# Patient Record
Sex: MALE | Race: WHITE | Employment: FULL TIME | URBAN - METROPOLITAN AREA
[De-identification: names, ages, dates, MRNs, and addresses within clinical notes are randomized per-mention and may not be internally consistent; named-entity substitution may affect disease eponyms.]

---

## 2018-04-03 ENCOUNTER — OFFICE VISIT (OUTPATIENT)
Dept: INTERNAL MEDICINE CLINIC | Age: 43
End: 2018-04-03

## 2018-04-03 VITALS
DIASTOLIC BLOOD PRESSURE: 82 MMHG | BODY MASS INDEX: 28.7 KG/M2 | WEIGHT: 205 LBS | SYSTOLIC BLOOD PRESSURE: 124 MMHG | HEART RATE: 60 BPM | HEIGHT: 71 IN

## 2018-04-03 DIAGNOSIS — G44.82 ORGASMIC HEADACHE: ICD-10-CM

## 2018-04-03 DIAGNOSIS — Z76.89 ENCOUNTER TO ESTABLISH CARE: Primary | ICD-10-CM

## 2018-04-03 DIAGNOSIS — G44.52 NEW DAILY PERSISTENT HEADACHE: ICD-10-CM

## 2018-04-03 PROCEDURE — 99203 OFFICE O/P NEW LOW 30 MIN: CPT | Performed by: NURSE PRACTITIONER

## 2018-04-03 RX ORDER — SUMATRIPTAN 20 MG/1
1 SPRAY NASAL DAILY PRN
Qty: 1 INHALER | Refills: 3 | Status: SHIPPED | OUTPATIENT
Start: 2018-04-03 | End: 2018-11-12

## 2018-04-03 RX ORDER — IBUPROFEN 200 MG
400 TABLET ORAL PRN
COMMUNITY
End: 2018-11-12

## 2018-04-03 RX ORDER — ACETAMINOPHEN, ASPIRIN AND CAFFEINE 250; 250; 65 MG/1; MG/1; MG/1
2 TABLET, FILM COATED ORAL EVERY 12 HOURS PRN
COMMUNITY
End: 2018-11-12

## 2018-04-03 RX ORDER — BUTALBITAL, ASPIRIN, AND CAFFEINE 325; 50; 40 MG/1; MG/1; MG/1
1 CAPSULE ORAL EVERY 4 HOURS PRN
COMMUNITY
Start: 2018-03-21 | End: 2018-04-16

## 2018-04-03 RX ORDER — MELOXICAM 15 MG/1
1 TABLET ORAL DAILY
COMMUNITY
Start: 2018-03-16 | End: 2018-04-16

## 2018-04-03 RX ORDER — PREDNISONE 10 MG/1
1 TABLET ORAL DAILY
COMMUNITY
Start: 2018-03-26 | End: 2018-04-09

## 2018-04-03 RX ORDER — AMOXICILLIN AND CLAVULANATE POTASSIUM 500; 125 MG/1; MG/1
1 TABLET, FILM COATED ORAL 3 TIMES DAILY
COMMUNITY
Start: 2018-04-02 | End: 2018-04-17

## 2018-04-04 ASSESSMENT — ENCOUNTER SYMPTOMS
RESPIRATORY NEGATIVE: 1
GASTROINTESTINAL NEGATIVE: 1
CHEST TIGHTNESS: 0

## 2018-04-06 DIAGNOSIS — G44.52 NEW PERSISTENT DAILY HEADACHE: ICD-10-CM

## 2018-04-06 DIAGNOSIS — G44.82 ORGASMIC HEADACHE: Primary | ICD-10-CM

## 2018-04-16 ENCOUNTER — OFFICE VISIT (OUTPATIENT)
Dept: INTERNAL MEDICINE CLINIC | Age: 43
End: 2018-04-16

## 2018-04-16 VITALS
BODY MASS INDEX: 28.56 KG/M2 | DIASTOLIC BLOOD PRESSURE: 80 MMHG | HEART RATE: 60 BPM | HEIGHT: 71 IN | SYSTOLIC BLOOD PRESSURE: 114 MMHG | WEIGHT: 204 LBS

## 2018-04-16 DIAGNOSIS — G44.52 NEW DAILY PERSISTENT HEADACHE: ICD-10-CM

## 2018-04-16 DIAGNOSIS — G44.82 ORGASMIC HEADACHE: Primary | ICD-10-CM

## 2018-04-16 PROCEDURE — 99213 OFFICE O/P EST LOW 20 MIN: CPT | Performed by: NURSE PRACTITIONER

## 2018-04-23 ENCOUNTER — HOSPITAL ENCOUNTER (OUTPATIENT)
Dept: CT IMAGING | Age: 43
Discharge: HOME | End: 2018-04-23
Attending: NURSE PRACTITIONER | Admitting: NURSE PRACTITIONER

## 2018-04-23 DIAGNOSIS — G44.52 NEW PERSISTENT DAILY HEADACHE: ICD-10-CM

## 2018-04-23 DIAGNOSIS — G44.82 ORGASMIC HEADACHE: ICD-10-CM

## 2018-04-23 DIAGNOSIS — G44.82 HEADACHE ASSOCIATED WITH SEXUAL ACTIVITY: ICD-10-CM

## 2018-04-27 ENCOUNTER — TELEPHONE (OUTPATIENT)
Dept: INTERNAL MEDICINE CLINIC | Age: 43
End: 2018-04-27

## 2018-04-27 DIAGNOSIS — R51.9 CHRONIC DAILY HEADACHE: Primary | ICD-10-CM

## 2018-04-27 RX ORDER — AMITRIPTYLINE HYDROCHLORIDE 25 MG/1
25 TABLET, FILM COATED ORAL NIGHTLY
Qty: 30 TABLET | Refills: 5 | Status: SHIPPED | OUTPATIENT
Start: 2018-04-27 | End: 2018-11-12

## 2018-11-12 ENCOUNTER — OFFICE VISIT (OUTPATIENT)
Dept: INTERNAL MEDICINE CLINIC | Age: 43
End: 2018-11-12
Payer: COMMERCIAL

## 2018-11-12 VITALS
TEMPERATURE: 98.4 F | HEART RATE: 64 BPM | WEIGHT: 207 LBS | BODY MASS INDEX: 29.28 KG/M2 | SYSTOLIC BLOOD PRESSURE: 122 MMHG | DIASTOLIC BLOOD PRESSURE: 84 MMHG

## 2018-11-12 DIAGNOSIS — J40 BRONCHITIS: Primary | ICD-10-CM

## 2018-11-12 DIAGNOSIS — Z23 NEED FOR INFLUENZA VACCINATION: ICD-10-CM

## 2018-11-12 DIAGNOSIS — B35.3 TINEA PEDIS OF BOTH FEET: ICD-10-CM

## 2018-11-12 DIAGNOSIS — B35.6 TINEA CRURIS: ICD-10-CM

## 2018-11-12 PROCEDURE — 90682 RIV4 VACC RECOMBINANT DNA IM: CPT | Performed by: NURSE PRACTITIONER

## 2018-11-12 PROCEDURE — 99213 OFFICE O/P EST LOW 20 MIN: CPT | Performed by: NURSE PRACTITIONER

## 2018-11-12 PROCEDURE — 90471 IMMUNIZATION ADMIN: CPT | Performed by: NURSE PRACTITIONER

## 2018-11-12 RX ORDER — TERBINAFINE HYDROCHLORIDE 250 MG/1
250 TABLET ORAL DAILY
Qty: 14 TABLET | Refills: 0 | Status: SHIPPED | OUTPATIENT
Start: 2018-11-12 | End: 2018-11-26

## 2018-11-12 RX ORDER — BENZONATATE 100 MG/1
100 CAPSULE ORAL EVERY 6 HOURS PRN
Qty: 30 CAPSULE | Refills: 0 | Status: SHIPPED | OUTPATIENT
Start: 2018-11-12 | End: 2018-11-22

## 2018-11-12 ASSESSMENT — PATIENT HEALTH QUESTIONNAIRE - PHQ9
2. FEELING DOWN, DEPRESSED OR HOPELESS: 0
SUM OF ALL RESPONSES TO PHQ QUESTIONS 1-9: 0
SUM OF ALL RESPONSES TO PHQ9 QUESTIONS 1 & 2: 0
SUM OF ALL RESPONSES TO PHQ QUESTIONS 1-9: 0
1. LITTLE INTEREST OR PLEASURE IN DOING THINGS: 0

## 2018-11-12 NOTE — PROGRESS NOTES
SUBJECTIVE:    Patient ID: Suzan Rojas is a 44 y.o. male. CC: Cough, rash    HPI: The patient presents to the office for an acute visit. Cough: He reports 3-4 week history of cough. He is coughing up clear to white phlegm. He denies any fever. Cough is worse in the evening and at night. He is a smoker. He denies any night sweats or weight loss. He denies any hemoptysis. He has a rash in his groin. This is present since the summer when he was working outdoors hot and sweaty. Rash is itchy. He has tried over-the-counter remedies without relief. No current outpatient prescriptions on file. No current facility-administered medications for this visit. Review of Systems   Constitutional: Negative for fever and unexpected weight change. Respiratory: Positive for cough and wheezing. Negative for shortness of breath. Skin: Positive for rash. All other systems reviewed and are negative. OBJECTIVE:  Physical Exam   Constitutional: He appears well-developed and well-nourished. No distress. HENT:   Head: Normocephalic and atraumatic. Pulmonary/Chest: Effort normal and breath sounds normal. No respiratory distress. He has no wheezes. He has no rales. Skin: Skin is warm and dry. Rash noted. He is not diaphoretic. Well-demarcated fungal-appearing rash in the bilateral groin      /84   Pulse 64   Temp 98.4 °F (36.9 °C) (Oral)   Wt 207 lb (93.9 kg)   BMI 29.28 kg/m²      PHQ Scores 11/12/2018   PHQ2 Score 0   PHQ9 Score 0     Interpretation of Total Score Depression Severity: 1-4 = Minimal depression, 5-9 = Mild depression, 10-14 = Moderate depression, 15-19 = Moderately severe depression, 20-27 =Severe depression        ASSESSMENT/PLAN:  Aly Crews was seen today for rash and cough. Diagnoses and all orders for this visit:    Bronchitis  -     benzonatate (TESSALON PERLES) 100 MG capsule;  Take 1 capsule by mouth every 6 hours as needed for Cough    Tinea

## 2018-11-14 ASSESSMENT — ENCOUNTER SYMPTOMS
SHORTNESS OF BREATH: 0
COUGH: 1
WHEEZING: 1

## 2019-05-06 ENCOUNTER — OFFICE VISIT (OUTPATIENT)
Dept: INTERNAL MEDICINE CLINIC | Age: 44
End: 2019-05-06
Payer: COMMERCIAL

## 2019-05-06 VITALS
HEART RATE: 56 BPM | WEIGHT: 206 LBS | HEIGHT: 71 IN | DIASTOLIC BLOOD PRESSURE: 68 MMHG | SYSTOLIC BLOOD PRESSURE: 116 MMHG | BODY MASS INDEX: 28.84 KG/M2

## 2019-05-06 DIAGNOSIS — R07.9 CHEST PAIN, UNSPECIFIED TYPE: Primary | ICD-10-CM

## 2019-05-06 DIAGNOSIS — Z82.49 FAMILY HISTORY OF CORONARY ARTERY DISEASE: ICD-10-CM

## 2019-05-06 PROCEDURE — 99213 OFFICE O/P EST LOW 20 MIN: CPT | Performed by: NURSE PRACTITIONER

## 2019-05-06 PROCEDURE — 93000 ELECTROCARDIOGRAM COMPLETE: CPT | Performed by: NURSE PRACTITIONER

## 2019-05-06 ASSESSMENT — PATIENT HEALTH QUESTIONNAIRE - PHQ9
SUM OF ALL RESPONSES TO PHQ QUESTIONS 1-9: 0
1. LITTLE INTEREST OR PLEASURE IN DOING THINGS: 0
SUM OF ALL RESPONSES TO PHQ QUESTIONS 1-9: 0
SUM OF ALL RESPONSES TO PHQ9 QUESTIONS 1 & 2: 0
2. FEELING DOWN, DEPRESSED OR HOPELESS: 0

## 2019-05-06 ASSESSMENT — ENCOUNTER SYMPTOMS: SHORTNESS OF BREATH: 1

## 2019-05-06 NOTE — PROGRESS NOTES
SUBJECTIVE:    Patient ID: Geovanna Lagunas is a 44 y.o. male. CC: chest pain    HPI: The patient presents to the office for an acute visit. Left sided chest pains lasting less than one minute. Last episode was yesterday. No rash. No known injury injury. Occurring randomly but mostly nonexertional.  Had one occurrence of left arm numbness. Some associated dyspnea. No treatments at home. No current outpatient medications on file. No current facility-administered medications for this visit. Review of Systems   Constitutional: Negative for fever. Respiratory: Positive for shortness of breath. Cardiovascular: Positive for chest pain. Skin: Negative for rash. OBJECTIVE:  Physical Exam   Constitutional: He is oriented to person, place, and time. He appears well-developed and well-nourished. No distress. HENT:   Head: Normocephalic and atraumatic. Eyes: Conjunctivae are normal. No scleral icterus. Neck: Neck supple. No JVD present. Cardiovascular: Normal rate and regular rhythm. Pulmonary/Chest: Effort normal and breath sounds normal. No respiratory distress. He has no wheezes. He has no rales. Abdominal: Soft. He exhibits no distension. There is no tenderness. There is no rebound and no guarding. Musculoskeletal: Normal range of motion. He exhibits no edema. Neurological: He is alert and oriented to person, place, and time. Skin: Skin is warm and dry. He is not diaphoretic. Psychiatric: He has a normal mood and affect.  His behavior is normal. Thought content normal.      /68   Pulse 56   Ht 5' 10.5\" (1.791 m)   Wt 206 lb (93.4 kg)   BMI 29.14 kg/m²      PHQ Scores 5/6/2019 11/12/2018   PHQ2 Score 0 0   PHQ9 Score 0 0     Interpretation of Total Score Depression Severity: 1-4 = Minimal depression, 5-9 = Mild depression, 10-14 = Moderate depression, 15-19 = Moderately severe depression, 20-27 =Severe depression      ASSESSMENT/PLAN:  [facility] was seen today for chest pain.     Diagnoses and all orders for this visit:    Chest pain, unspecified type  -     EKG 12 lead  -     TROPONIN  -     (Gxt) Stress Test Exercise W Out Myoview  -     Comprehensive Metabolic Panel  -     TSH with Reflex    Family history of coronary artery disease  -     (Gxt) Stress Test Exercise W Out Myoview    - CP, atypical in history  - Advised to go to ER for worsening CP unrelieved by rest  - EKG shows SB  - Check labs, stress test      JOANNA Jonas - CNP

## 2019-05-07 LAB
A/G RATIO: 1.7 (ref 1.1–2.2)
ALBUMIN SERPL-MCNC: 4.7 G/DL (ref 3.4–5)
ALP BLD-CCNC: 88 U/L (ref 40–129)
ALT SERPL-CCNC: 42 U/L (ref 10–40)
ANION GAP SERPL CALCULATED.3IONS-SCNC: 16 MMOL/L (ref 3–16)
AST SERPL-CCNC: 22 U/L (ref 15–37)
BILIRUB SERPL-MCNC: <0.2 MG/DL (ref 0–1)
BUN BLDV-MCNC: 12 MG/DL (ref 7–20)
CALCIUM SERPL-MCNC: 9.7 MG/DL (ref 8.3–10.6)
CHLORIDE BLD-SCNC: 102 MMOL/L (ref 99–110)
CO2: 24 MMOL/L (ref 21–32)
CREAT SERPL-MCNC: 1.1 MG/DL (ref 0.9–1.3)
GFR AFRICAN AMERICAN: >60
GFR NON-AFRICAN AMERICAN: >60
GLOBULIN: 2.8 G/DL
GLUCOSE BLD-MCNC: 96 MG/DL (ref 70–99)
POTASSIUM SERPL-SCNC: 4.9 MMOL/L (ref 3.5–5.1)
SODIUM BLD-SCNC: 142 MMOL/L (ref 136–145)
TOTAL PROTEIN: 7.5 G/DL (ref 6.4–8.2)
TROPONIN: <0.01 NG/ML
TSH REFLEX: 2.37 UIU/ML (ref 0.27–4.2)

## 2019-05-09 ENCOUNTER — HOSPITAL ENCOUNTER (OUTPATIENT)
Dept: NON INVASIVE DIAGNOSTICS | Age: 44
Discharge: HOME | End: 2019-05-09
Payer: COMMERCIAL

## 2019-05-09 PROCEDURE — 93017 CV STRESS TEST TRACING ONLY: CPT | Performed by: INTERNAL MEDICINE

## 2019-05-09 NOTE — PROGRESS NOTES
Patient instructed on Plain [facility]me[facility] Protocol Stress Test Procedure including possible side effects. Verbalizes knowledge and understanding and denies having any questions.

## 2019-07-08 ENCOUNTER — TELEPHONE (OUTPATIENT)
Dept: INTERNAL MEDICINE CLINIC | Age: 44
End: 2019-07-08

## 2019-07-08 DIAGNOSIS — B35.3 TINEA PEDIS OF BOTH FEET: ICD-10-CM

## 2019-07-08 DIAGNOSIS — B35.6 TINEA CRURIS: ICD-10-CM

## 2024-01-09 ENCOUNTER — OFFICE VISIT (OUTPATIENT)
Dept: INTERNAL MEDICINE CLINIC | Age: 49
End: 2024-01-09
Payer: COMMERCIAL

## 2024-01-09 VITALS
SYSTOLIC BLOOD PRESSURE: 122 MMHG | OXYGEN SATURATION: 95 % | BODY MASS INDEX: 29.37 KG/M2 | HEIGHT: 71 IN | HEART RATE: 69 BPM | WEIGHT: 209.8 LBS | DIASTOLIC BLOOD PRESSURE: 88 MMHG

## 2024-01-09 DIAGNOSIS — Z23 NEED FOR PNEUMOCOCCAL VACCINE: ICD-10-CM

## 2024-01-09 DIAGNOSIS — L72.0 EPIDERMOID CYST OF SKIN OF CHEST: ICD-10-CM

## 2024-01-09 DIAGNOSIS — Z00.00 ANNUAL PHYSICAL EXAM: ICD-10-CM

## 2024-01-09 DIAGNOSIS — R19.5 LOOSE STOOLS: ICD-10-CM

## 2024-01-09 DIAGNOSIS — Z12.11 COLON CANCER SCREENING: ICD-10-CM

## 2024-01-09 DIAGNOSIS — Z76.89 ENCOUNTER TO ESTABLISH CARE: Primary | ICD-10-CM

## 2024-01-09 PROCEDURE — 90471 IMMUNIZATION ADMIN: CPT | Performed by: NURSE PRACTITIONER

## 2024-01-09 PROCEDURE — 99386 PREV VISIT NEW AGE 40-64: CPT | Performed by: NURSE PRACTITIONER

## 2024-01-09 PROCEDURE — 90677 PCV20 VACCINE IM: CPT | Performed by: NURSE PRACTITIONER

## 2024-01-09 PROCEDURE — G8484 FLU IMMUNIZE NO ADMIN: HCPCS | Performed by: NURSE PRACTITIONER

## 2024-01-09 SDOH — ECONOMIC STABILITY: FOOD INSECURITY: WITHIN THE PAST 12 MONTHS, THE FOOD YOU BOUGHT JUST DIDN'T LAST AND YOU DIDN'T HAVE MONEY TO GET MORE.: NEVER TRUE

## 2024-01-09 SDOH — ECONOMIC STABILITY: INCOME INSECURITY: HOW HARD IS IT FOR YOU TO PAY FOR THE VERY BASICS LIKE FOOD, HOUSING, MEDICAL CARE, AND HEATING?: NOT VERY HARD

## 2024-01-09 SDOH — ECONOMIC STABILITY: FOOD INSECURITY: WITHIN THE PAST 12 MONTHS, YOU WORRIED THAT YOUR FOOD WOULD RUN OUT BEFORE YOU GOT MONEY TO BUY MORE.: NEVER TRUE

## 2024-01-09 SDOH — ECONOMIC STABILITY: HOUSING INSECURITY
IN THE LAST 12 MONTHS, WAS THERE A TIME WHEN YOU DID NOT HAVE A STEADY PLACE TO SLEEP OR SLEPT IN A SHELTER (INCLUDING NOW)?: NO

## 2024-01-09 ASSESSMENT — PATIENT HEALTH QUESTIONNAIRE - PHQ9
SUM OF ALL RESPONSES TO PHQ QUESTIONS 1-9: 0
2. FEELING DOWN, DEPRESSED OR HOPELESS: 0
SUM OF ALL RESPONSES TO PHQ QUESTIONS 1-9: 0
SUM OF ALL RESPONSES TO PHQ9 QUESTIONS 1 & 2: 0
1. LITTLE INTEREST OR PLEASURE IN DOING THINGS: 0

## 2024-01-09 NOTE — PROGRESS NOTES
SUBJECTIVE:    Patient ID: Finesse Kirby is a 48 y.o. male.    CC: New patient appointment    HPI: The patient presents to the office to establish with a new primary care provider.    Patient was previously established here but has been lost to follow-up for over 3 years.  He was last seen in May, 2019.    He reports loose bowels.  There is no associated abdominal pain.  He denies any rectal pain or bleeding.    He noticed a lump on his chest.  This has been present for some time and getting larger.  It is generally asymptomatic.      No past medical history on file.     Past Surgical History:   Procedure Laterality Date    HERNIA REPAIR      KNEE SURGERY      KNEE SURGERY         No family history on file.    Social History     Socioeconomic History    Marital status: Single     Spouse name: Not on file    Number of children: Not on file    Years of education: Not on file    Highest education level: Not on file   Occupational History    Not on file   Tobacco Use    Smoking status: Every Day     Current packs/day: 0.50     Types: Cigarettes    Smokeless tobacco: Never   Vaping Use    Vaping Use: Never used   Substance and Sexual Activity    Alcohol use: Yes     Alcohol/week: 5.0 standard drinks of alcohol     Types: 5 Cans of beer per week    Drug use: No    Sexual activity: Yes   Other Topics Concern    Not on file   Social History Narrative    Not on file     Social Determinants of Health     Financial Resource Strain: Low Risk  (1/9/2024)    Overall Financial Resource Strain (CARDIA)     Difficulty of Paying Living Expenses: Not very hard   Food Insecurity: No Food Insecurity (1/9/2024)    Hunger Vital Sign     Worried About Running Out of Food in the Last Year: Never true     Ran Out of Food in the Last Year: Never true   Transportation Needs: Unknown (1/9/2024)    PRAPARE - Transportation     Lack of Transportation (Medical): Not on file     Lack of Transportation (Non-Medical): No   Physical Activity:

## 2024-01-15 ENCOUNTER — OFFICE VISIT (OUTPATIENT)
Dept: SURGERY | Age: 49
End: 2024-01-15
Payer: COMMERCIAL

## 2024-01-15 VITALS
DIASTOLIC BLOOD PRESSURE: 76 MMHG | SYSTOLIC BLOOD PRESSURE: 120 MMHG | BODY MASS INDEX: 29.12 KG/M2 | HEIGHT: 71 IN | WEIGHT: 208 LBS

## 2024-01-15 DIAGNOSIS — L72.3 SEBACEOUS CYST: Primary | ICD-10-CM

## 2024-01-15 PROCEDURE — G8427 DOCREV CUR MEDS BY ELIG CLIN: HCPCS | Performed by: SURGERY

## 2024-01-15 PROCEDURE — 99203 OFFICE O/P NEW LOW 30 MIN: CPT | Performed by: SURGERY

## 2024-01-15 PROCEDURE — G8419 CALC BMI OUT NRM PARAM NOF/U: HCPCS | Performed by: SURGERY

## 2024-01-15 PROCEDURE — 4004F PT TOBACCO SCREEN RCVD TLK: CPT | Performed by: SURGERY

## 2024-01-15 PROCEDURE — G8484 FLU IMMUNIZE NO ADMIN: HCPCS | Performed by: SURGERY

## 2024-01-15 ASSESSMENT — ENCOUNTER SYMPTOMS
APNEA: 0
COLOR CHANGE: 0
ABDOMINAL DISTENTION: 0
ABDOMINAL PAIN: 0
EYE ITCHING: 0
CHEST TIGHTNESS: 0
BACK PAIN: 0
EYE DISCHARGE: 0

## 2024-01-15 NOTE — PATIENT INSTRUCTIONS
1. We discussed the risks of surgery including bleeding, infection, as well as the cost and pain related to the procedure. Benefits of excision include resolution of symptoms and definitive tissue diagnosis. Alternatives include close observation. The patient understands, agrees, and wishes to proceed with excision  2. We also discussed anesthesia options including general anesthesia, local anesthetic only, and local anesthetic with sedation. General anesthesia provides the most patient relaxation but at the cost of higher risk as it includes endotracheal intubation. Local anesthetic poses the least risk to the patient but is the most uncomfortable. Additional benefit of local anesthetic only is that the patient could drive home from the procedure. The patient has chosen local anesthetic only  3. Will schedule for excision of sebaceous cyst on chest with local anesthetic only as outpatient procedure to be performed in office

## 2024-01-17 DIAGNOSIS — Z00.00 ANNUAL PHYSICAL EXAM: ICD-10-CM

## 2024-01-17 LAB
ALBUMIN SERPL-MCNC: 4.7 G/DL (ref 3.4–5)
ALBUMIN/GLOB SERPL: 2 {RATIO} (ref 1.1–2.2)
ALP SERPL-CCNC: 75 U/L (ref 40–129)
ALT SERPL-CCNC: 33 U/L (ref 10–40)
ANION GAP SERPL CALCULATED.3IONS-SCNC: 10 MMOL/L (ref 3–16)
AST SERPL-CCNC: 19 U/L (ref 15–37)
BASOPHILS # BLD: 0.1 K/UL (ref 0–0.2)
BASOPHILS NFR BLD: 0.8 %
BILIRUB SERPL-MCNC: 0.3 MG/DL (ref 0–1)
BUN SERPL-MCNC: 14 MG/DL (ref 7–20)
CALCIUM SERPL-MCNC: 9.5 MG/DL (ref 8.3–10.6)
CHLORIDE SERPL-SCNC: 101 MMOL/L (ref 99–110)
CHOLEST SERPL-MCNC: 279 MG/DL (ref 0–199)
CO2 SERPL-SCNC: 29 MMOL/L (ref 21–32)
CREAT SERPL-MCNC: 0.9 MG/DL (ref 0.9–1.3)
DEPRECATED RDW RBC AUTO: 13.2 % (ref 12.4–15.4)
EOSINOPHIL # BLD: 0.2 K/UL (ref 0–0.6)
EOSINOPHIL NFR BLD: 2.3 %
GFR SERPLBLD CREATININE-BSD FMLA CKD-EPI: >60 ML/MIN/{1.73_M2}
GLUCOSE SERPL-MCNC: 112 MG/DL (ref 70–99)
HCT VFR BLD AUTO: 48.9 % (ref 40.5–52.5)
HCV AB SERPL QL IA: NORMAL
HDLC SERPL-MCNC: 43 MG/DL (ref 40–60)
HGB BLD-MCNC: 16.3 G/DL (ref 13.5–17.5)
LDLC SERPL CALC-MCNC: 178 MG/DL
LYMPHOCYTES # BLD: 2.3 K/UL (ref 1–5.1)
LYMPHOCYTES NFR BLD: 25.4 %
MCH RBC QN AUTO: 31.5 PG (ref 26–34)
MCHC RBC AUTO-ENTMCNC: 33.3 G/DL (ref 31–36)
MCV RBC AUTO: 94.6 FL (ref 80–100)
MONOCYTES # BLD: 0.8 K/UL (ref 0–1.3)
MONOCYTES NFR BLD: 8.9 %
NEUTROPHILS # BLD: 5.6 K/UL (ref 1.7–7.7)
NEUTROPHILS NFR BLD: 62.6 %
PLATELET # BLD AUTO: 258 K/UL (ref 135–450)
PMV BLD AUTO: 9 FL (ref 5–10.5)
POTASSIUM SERPL-SCNC: 4.8 MMOL/L (ref 3.5–5.1)
PROT SERPL-MCNC: 7 G/DL (ref 6.4–8.2)
RBC # BLD AUTO: 5.17 M/UL (ref 4.2–5.9)
SODIUM SERPL-SCNC: 140 MMOL/L (ref 136–145)
TRIGL SERPL-MCNC: 292 MG/DL (ref 0–150)
VLDLC SERPL CALC-MCNC: 58 MG/DL
WBC # BLD AUTO: 9 K/UL (ref 4–11)

## 2024-01-18 LAB
EST. AVERAGE GLUCOSE BLD GHB EST-MCNC: 108.3 MG/DL
HBA1C MFR BLD: 5.4 %

## 2024-01-19 LAB
HIV 1+2 AB+HIV1 P24 AG SERPL QL IA: NORMAL
HIV 2 AB SERPL QL IA: NORMAL
HIV1 AB SERPL QL IA: NORMAL
HIV1 P24 AG SERPL QL IA: NORMAL

## 2024-01-22 ENCOUNTER — TELEPHONE (OUTPATIENT)
Dept: INTERNAL MEDICINE CLINIC | Age: 49
End: 2024-01-22

## 2024-01-22 ENCOUNTER — PROCEDURE VISIT (OUTPATIENT)
Dept: SURGERY | Age: 49
End: 2024-01-22
Payer: COMMERCIAL

## 2024-01-22 VITALS — BODY MASS INDEX: 29.42 KG/M2 | WEIGHT: 208 LBS | DIASTOLIC BLOOD PRESSURE: 70 MMHG | SYSTOLIC BLOOD PRESSURE: 111 MMHG

## 2024-01-22 DIAGNOSIS — E78.2 MIXED HYPERLIPIDEMIA: Primary | ICD-10-CM

## 2024-01-22 DIAGNOSIS — L72.3 SEBACEOUS CYST: Primary | ICD-10-CM

## 2024-01-22 PROCEDURE — 12032 INTMD RPR S/A/T/EXT 2.6-7.5: CPT | Performed by: SURGERY

## 2024-01-22 PROCEDURE — 11403 EXC TR-EXT B9+MARG 2.1-3CM: CPT | Performed by: SURGERY

## 2024-01-22 RX ORDER — ATORVASTATIN CALCIUM 20 MG/1
20 TABLET, FILM COATED ORAL EVERY EVENING
Qty: 90 TABLET | Refills: 3 | Status: SHIPPED | OUTPATIENT
Start: 2024-01-22

## 2024-01-22 RX ORDER — ATORVASTATIN CALCIUM 20 MG/1
20 TABLET, FILM COATED ORAL DAILY
Qty: 90 TABLET | Refills: 3 | Status: SHIPPED | OUTPATIENT
Start: 2024-01-22 | End: 2024-01-22

## 2024-01-22 ASSESSMENT — ENCOUNTER SYMPTOMS
RESPIRATORY NEGATIVE: 1
VOMITING: 0
NAUSEA: 0
DIARRHEA: 1
ABDOMINAL PAIN: 0
BLOOD IN STOOL: 0

## 2024-01-22 NOTE — PROGRESS NOTES
Office Procedure Note  Indications:   Finesse Kirby   : 1975   Today: 2024  48 y.o. male presents for excision of a growing sebaceous cyst on his chest    Procedure: Excision of sebaceous cyst on chest    Anesthesia: Subcutaneous lidocaine    Procedure Details   Using clean technique, the sebaceous cyst on chest  was cleaned with alcohol scrub. Local anesthetic was injected around the cyst circumferentially. An elliptical incision was made around the cyst. The widest diameter of the mass was 2.8 cm and The length of the incision was 4 cm. The cyst was removed in its entirety and passed off as specimen. The incision was closed in layers with 3-0 vicryl and 3-0 nylon suture . The wound was dressed with antibiotic ointment, gauze, tape. The patient tolerated the procedure well without complications    Bleeding:  Minimal    Hemostasis Achieved:  by pressure    Response to treatment:  Well tolerated by patient.     Post-procedure dressing:  Antibiotic ointment, gauze and tape    Post-procedure instructions:  Keep dressing as dry as possible. Leave dressing in place for 48 hours and then may remove. Replace daily with dry dressing as needed, Reapply antibiotic ointment daily, and May shower after dressing is removed. Avoid scrubbing over incision  Pain control with tylenol and ibuprofen with food as needed and Minimize heavy lifting for 2 weeks  Return to office in 10-14 days for suture removal  Will discuss pathology at return office visit    Johnathon Saul MD, FACS  2024  10:30 AM

## 2024-01-22 NOTE — TELEPHONE ENCOUNTER
Patient called back re: lab results (Cholesterol). He stated that he is in agreement with starting medications. Please send to pharmacy listed.

## 2024-01-22 NOTE — PATIENT INSTRUCTIONS
Keep dressing as dry as possible. Leave dressing in place for 48 hours and then may remove. Replace daily with dry dressing as needed, Reapply antibiotic ointment daily, and May shower after dressing is removed. Avoid scrubbing over incision  Pain control with tylenol and ibuprofen with food as needed and Minimize heavy lifting for 2 weeks  Return to office in 10-14 days for suture removal  Will discuss pathology at return office visit

## 2024-01-25 ENCOUNTER — TELEPHONE (OUTPATIENT)
Dept: INTERNAL MEDICINE CLINIC | Age: 49
End: 2024-01-25

## 2024-01-25 NOTE — TELEPHONE ENCOUNTER
Pt calling saw you last week---you had told him to take some fiber pills---he is wanting to know how many grams or milligrams he should everyday---please call the pt.  Thanks.

## 2024-02-02 ENCOUNTER — OFFICE VISIT (OUTPATIENT)
Dept: SURGERY | Age: 49
End: 2024-02-02

## 2024-02-02 VITALS — WEIGHT: 208 LBS | SYSTOLIC BLOOD PRESSURE: 118 MMHG | BODY MASS INDEX: 29.42 KG/M2 | DIASTOLIC BLOOD PRESSURE: 80 MMHG

## 2024-02-02 DIAGNOSIS — Z09 SURGERY FOLLOW-UP: Primary | ICD-10-CM

## 2024-02-02 PROCEDURE — 99024 POSTOP FOLLOW-UP VISIT: CPT | Performed by: SURGERY

## 2024-02-02 NOTE — PROGRESS NOTES
Final    Total Protein 01/17/2024 7.0  6.4 - 8.2 g/dL Final    Albumin 01/17/2024 4.7  3.4 - 5.0 g/dL Final    Albumin/Globulin Ratio 01/17/2024 2.0  1.1 - 2.2 Final    Total Bilirubin 01/17/2024 0.3  0.0 - 1.0 mg/dL Final    Alkaline Phosphatase 01/17/2024 75  40 - 129 U/L Final    ALT 01/17/2024 33  10 - 40 U/L Final    AST 01/17/2024 19  15 - 37 U/L Final    Hep C Ab Interp 01/17/2024 Non-reactive  Non-reactive Final    HIV Ag/Ab 01/17/2024 Non-Reactive  Non-reactive Final    HIV-1 Antibody 01/17/2024 Non-Reactive  Non-reactive Final    HIV ANTIGEN 01/17/2024 Non-Reactive  Non-reactive Final    HIV-2 Ab 01/17/2024 Non-Reactive  Non-reactive Final    Cholesterol, Total 01/17/2024 279 (H)  0 - 199 mg/dL Final    Triglycerides 01/17/2024 292 (H)  0 - 150 mg/dL Final    HDL 01/17/2024 43  40 - 60 mg/dL Final    Comment: An HDL cholesterol less than 40 mg/dL is low and  constitutes a coronary heart disease risk factor.  An HDL cholesterol greater than 60 mg/dL is a  negative risk factor for coronary heart disease.      LDL Calculated 01/17/2024 178 (H)  <100 mg/dL Final    VLDL Cholesterol Calculated 01/17/2024 58  Not Established mg/dL Final    Hemoglobin A1C 01/17/2024 5.4  See comment % Final    Comment: Comment:  Diagnosis of Diabetes: > or = 6.5%  Increased risk of diabetes (Prediabetes): 5.7-6.4%  Glycemic Control: Nonpregnant Adults: <7.0%                    Pregnant: <6.0%        Estimated Avg Glucose 01/17/2024 108.3  mg/dL Final    WBC 01/17/2024 9.0  4.0 - 11.0 K/uL Final    RBC 01/17/2024 5.17  4.20 - 5.90 M/uL Final    Hemoglobin 01/17/2024 16.3  13.5 - 17.5 g/dL Final    Hematocrit 01/17/2024 48.9  40.5 - 52.5 % Final    MCV 01/17/2024 94.6  80.0 - 100.0 fL Final    MCH 01/17/2024 31.5  26.0 - 34.0 pg Final    MCHC 01/17/2024 33.3  31.0 - 36.0 g/dL Final    RDW 01/17/2024 13.2  12.4 - 15.4 % Final    Platelets 01/17/2024 258  135 - 450 K/uL Final    MPV 01/17/2024 9.0  5.0 - 10.5 fL Final    Neutrophils

## 2024-02-02 NOTE — PATIENT INSTRUCTIONS
1. Steri-strips will fall off on their own  2. No restrictions with regard to bathing, swimming  3. No restrictions with regard to diet  4. No restrictions with regard to activity  5. Pathology benign, no further screening or follow-up necessary  6. Follow with general surgery as needed

## 2024-02-21 ENCOUNTER — TELEPHONE (OUTPATIENT)
Dept: INTERNAL MEDICINE CLINIC | Age: 49
End: 2024-02-21

## 2024-06-26 ENCOUNTER — OFFICE VISIT (OUTPATIENT)
Dept: INTERNAL MEDICINE CLINIC | Age: 49
End: 2024-06-26
Payer: COMMERCIAL

## 2024-06-26 VITALS
HEART RATE: 64 BPM | BODY MASS INDEX: 29.82 KG/M2 | WEIGHT: 210.8 LBS | SYSTOLIC BLOOD PRESSURE: 128 MMHG | OXYGEN SATURATION: 98 % | DIASTOLIC BLOOD PRESSURE: 78 MMHG

## 2024-06-26 DIAGNOSIS — R10.31 INGUINAL PAIN, RIGHT: Primary | ICD-10-CM

## 2024-06-26 PROCEDURE — 99213 OFFICE O/P EST LOW 20 MIN: CPT | Performed by: INTERNAL MEDICINE

## 2024-06-26 PROCEDURE — 4004F PT TOBACCO SCREEN RCVD TLK: CPT | Performed by: INTERNAL MEDICINE

## 2024-06-26 PROCEDURE — G8427 DOCREV CUR MEDS BY ELIG CLIN: HCPCS | Performed by: INTERNAL MEDICINE

## 2024-06-26 PROCEDURE — G8419 CALC BMI OUT NRM PARAM NOF/U: HCPCS | Performed by: INTERNAL MEDICINE

## 2024-06-26 ASSESSMENT — ENCOUNTER SYMPTOMS
BLOOD IN STOOL: 0
ANAL BLEEDING: 0
VOMITING: 0
CONSTIPATION: 0
ABDOMINAL DISTENTION: 0
NAUSEA: 0
DIARRHEA: 0
ABDOMINAL PAIN: 0
COLOR CHANGE: 0

## 2024-06-26 NOTE — ASSESSMENT & PLAN NOTE
This could possibly be from scar tissue/adhesions. He has no abdominal pain or changes in bowel habits.   He would like to discuss this with a surgeon, so I am placing a referral. A strained muscle is possible, but seems less likely given location and timing (duration of 4+ months).

## 2024-06-26 NOTE — PROGRESS NOTES
Finesse Kirby (:  1975) is a 48 y.o. male here for evaluation of the following chief complaint(s):  Abdominal Pain (Lower right abdominal pain/tightness x 3-4 months/Hernia repair )      ASSESSMENT/PLAN:  1. Inguinal pain, right  Assessment & Plan:     This could possibly be from scar tissue/adhesions. He has no abdominal pain or changes in bowel habits.   He would like to discuss this with a surgeon, so I am placing a referral. A strained muscle is possible, but seems less likely given location and timing (duration of 4+ months).  Orders:  -     Johnathon Abreu MD, General Surgery, [facility]      Return if symptoms worsen or fail to improve.    SUBJECTIVE/OBJECTIVE:  This is a 48 year old man with history of hernia repair. He presents for an acute visit.  He had a right inguiinal hernia repair in around . He is having occasional tightness over the area for the last 4 months. 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 He frequently undoes his belt after work due to discomfort.   His weight has been relatively stable but he believes he is down about 15-20 pounds from his surgery date.  He does not notice any lump or bump or anything that was similar to his prior hernia.     NO changes in bowel habits.         No past medical history on file.       Review of Systems   Constitutional:  Negative for chills and fever.   Gastrointestinal:  Negative for abdominal distention, abdominal pain, anal bleeding, blood in stool, constipation, diarrhea, nausea and vomiting.   Endocrine: Negative for polyuria.   Genitourinary:  Negative for difficulty urinating, dysuria, frequency, penile pain, penile swelling, scrotal swelling and testicular pain.   Skin:  Negative for color change and rash.       /78   Pulse 64   Wt 95.6 kg (210 lb 12.8 oz)   SpO2 98%   BMI 29.82 kg/m²    Physical Exam  Vitals reviewed.   Constitutional:       General: He is not

## 2024-07-15 ENCOUNTER — TELEPHONE (OUTPATIENT)
Dept: INTERNAL MEDICINE CLINIC | Age: 49
End: 2024-07-15

## 2024-07-15 NOTE — TELEPHONE ENCOUNTER
CHEST PAIN  Age as an adult                                    Location of pain left chest, left arm, and SOB - very hot                      How long? 1 week                                                          4.) Radiates? Yes  Where? left arm                               5.) *Severity: 1 to 10 with 10 being the worst pain of their life * 7   6.) Intermittent Yes         7.) Deep breathing increases pain? Yes         8.) Nausea/Vomiting? No         9.) Sweating? Yes       10.) *Shortness of breath?* Yes       11.) History of heart/lung disease? No       12.) Diabetes? No   Spoke to patient, he did not want me to call 911 for him.   Patient agreed to go to [facility] and would contact his girlfriend to meet him there.

## 2024-07-29 ENCOUNTER — OFFICE VISIT (OUTPATIENT)
Dept: INTERNAL MEDICINE CLINIC | Age: 49
End: 2024-07-29
Payer: COMMERCIAL

## 2024-07-29 VITALS
DIASTOLIC BLOOD PRESSURE: 68 MMHG | BODY MASS INDEX: 28.73 KG/M2 | WEIGHT: 205.2 LBS | HEIGHT: 71 IN | OXYGEN SATURATION: 99 % | HEART RATE: 54 BPM | SYSTOLIC BLOOD PRESSURE: 110 MMHG

## 2024-07-29 DIAGNOSIS — F10.21 ALCOHOLISM IN REMISSION (HCC): ICD-10-CM

## 2024-07-29 DIAGNOSIS — Z09 HOSPITAL DISCHARGE FOLLOW-UP: Primary | ICD-10-CM

## 2024-07-29 DIAGNOSIS — F17.210 CIGARETTE NICOTINE DEPENDENCE WITHOUT COMPLICATION: ICD-10-CM

## 2024-07-29 DIAGNOSIS — Z09 HOSPITAL DISCHARGE FOLLOW-UP: ICD-10-CM

## 2024-07-29 DIAGNOSIS — I25.10 CORONARY ARTERY DISEASE INVOLVING NATIVE CORONARY ARTERY OF NATIVE HEART WITHOUT ANGINA PECTORIS: ICD-10-CM

## 2024-07-29 DIAGNOSIS — R25.2 MUSCLE CRAMP: ICD-10-CM

## 2024-07-29 DIAGNOSIS — I25.5 ISCHEMIC CARDIOMYOPATHY: ICD-10-CM

## 2024-07-29 DIAGNOSIS — E78.00 PURE HYPERCHOLESTEROLEMIA: ICD-10-CM

## 2024-07-29 DIAGNOSIS — F12.90 MARIJUANA USE: ICD-10-CM

## 2024-07-29 DIAGNOSIS — I21.4 NON-ST ELEVATED MYOCARDIAL INFARCTION (HCC): ICD-10-CM

## 2024-07-29 LAB
ALBUMIN SERPL-MCNC: 4.5 G/DL (ref 3.4–5)
ANION GAP SERPL CALCULATED.3IONS-SCNC: 10 MMOL/L (ref 3–16)
BASOPHILS # BLD: 0.1 K/UL (ref 0–0.2)
BASOPHILS NFR BLD: 0.5 %
BUN SERPL-MCNC: 10 MG/DL (ref 7–20)
CALCIUM SERPL-MCNC: 9.9 MG/DL (ref 8.3–10.6)
CHLORIDE SERPL-SCNC: 101 MMOL/L (ref 99–110)
CK SERPL-CCNC: 70 U/L (ref 39–308)
CO2 SERPL-SCNC: 28 MMOL/L (ref 21–32)
CREAT SERPL-MCNC: 0.8 MG/DL (ref 0.9–1.3)
DEPRECATED RDW RBC AUTO: 13.3 % (ref 12.4–15.4)
EOSINOPHIL # BLD: 0.1 K/UL (ref 0–0.6)
EOSINOPHIL NFR BLD: 1.3 %
GFR SERPLBLD CREATININE-BSD FMLA CKD-EPI: >90 ML/MIN/{1.73_M2}
GLUCOSE SERPL-MCNC: 95 MG/DL (ref 70–99)
HCT VFR BLD AUTO: 45 % (ref 40.5–52.5)
HGB BLD-MCNC: 15.2 G/DL (ref 13.5–17.5)
LYMPHOCYTES # BLD: 1.9 K/UL (ref 1–5.1)
LYMPHOCYTES NFR BLD: 20.1 %
MAGNESIUM SERPL-MCNC: 2.3 MG/DL (ref 1.8–2.4)
MCH RBC QN AUTO: 31.7 PG (ref 26–34)
MCHC RBC AUTO-ENTMCNC: 33.7 G/DL (ref 31–36)
MCV RBC AUTO: 94.1 FL (ref 80–100)
MONOCYTES # BLD: 0.7 K/UL (ref 0–1.3)
MONOCYTES NFR BLD: 7.8 %
NEUTROPHILS # BLD: 6.6 K/UL (ref 1.7–7.7)
NEUTROPHILS NFR BLD: 70.3 %
PHOSPHATE SERPL-MCNC: 4.2 MG/DL (ref 2.5–4.9)
PLATELET # BLD AUTO: 308 K/UL (ref 135–450)
PMV BLD AUTO: 9.4 FL (ref 5–10.5)
POTASSIUM SERPL-SCNC: 4.6 MMOL/L (ref 3.5–5.1)
RBC # BLD AUTO: 4.79 M/UL (ref 4.2–5.9)
SODIUM SERPL-SCNC: 139 MMOL/L (ref 136–145)
WBC # BLD AUTO: 9.4 K/UL (ref 4–11)

## 2024-07-29 PROCEDURE — 4004F PT TOBACCO SCREEN RCVD TLK: CPT | Performed by: NURSE PRACTITIONER

## 2024-07-29 PROCEDURE — G8419 CALC BMI OUT NRM PARAM NOF/U: HCPCS | Performed by: NURSE PRACTITIONER

## 2024-07-29 PROCEDURE — G8427 DOCREV CUR MEDS BY ELIG CLIN: HCPCS | Performed by: NURSE PRACTITIONER

## 2024-07-29 PROCEDURE — 99214 OFFICE O/P EST MOD 30 MIN: CPT | Performed by: NURSE PRACTITIONER

## 2024-07-29 RX ORDER — SACUBITRIL AND VALSARTAN 24; 26 MG/1; MG/1
1 TABLET, FILM COATED ORAL 2 TIMES DAILY
COMMUNITY

## 2024-07-29 RX ORDER — ASPIRIN 81 MG/1
81 TABLET ORAL DAILY
COMMUNITY

## 2024-07-29 RX ORDER — CARVEDILOL 3.12 MG/1
3.12 TABLET ORAL 2 TIMES DAILY WITH MEALS
COMMUNITY

## 2024-07-29 ASSESSMENT — ENCOUNTER SYMPTOMS
RESPIRATORY NEGATIVE: 1
GASTROINTESTINAL NEGATIVE: 1
SHORTNESS OF BREATH: 0

## 2024-07-29 NOTE — PROGRESS NOTES
SUBJECTIVE:    Patient ID: Finesse Kirby is a 48 y.o. male.    CC: Hospital for follow-up, acute coronary syndrome, acute ischemic cardiomyopathy, alcoholism, marijuana abuse, tobacco abuse, hyperlipidemia    HPI: The patient presents to the office today for hospital follow-up and follow-up of new and ongoing health concerns.    The patient was hospitalized at [facility] from 7/15/2024 until 7/18/2024 for acute coronary syndrome.    Patient presented with chest pain and was found to have non-ST elevated myocardial infarction.  He was evaluated by cardiology and underwent coronary angiogram and PCI to LAD stent, PTCA to diagonal.    Patient was started on aspirin, Brilinta, carvedilol, and Entresto.  His dose of statin was increased.  He was discharged on LifeVest to further evaluate acute ischemic cardiomyopathy.    Patient has a history of chronic alcohol, nicotine, and marijuana use.  He he was advised to stop all of this for increased cardiovascular risk.  Patient tells me he has stopped drinking, smoking and using marijuana.    Yesterday, he had a right calf cramp which is resolved.  There is no redness or swelling.      No past medical history on file.     Current Outpatient Medications   Medication Sig Dispense Refill    atorvastatin (LIPITOR) 20 MG tablet Take 1 tablet by mouth every evening 90 tablet 3    miconazole (DESENEX) 2 % powder Apply topically 2 times daily. 45 g 5     No current facility-administered medications for this visit.           Review of Systems   Constitutional: Negative.    Respiratory: Negative.  Negative for shortness of breath.    Cardiovascular: Negative.  Negative for chest pain and leg swelling.   Gastrointestinal: Negative.    Genitourinary: Negative.    Musculoskeletal:  Positive for myalgias.   Neurological: Negative.    Psychiatric/Behavioral: Negative.           OBJECTIVE:  Physical Exam  Vitals reviewed.   Constitutional:       General: He is not in acute

## 2024-08-09 NOTE — PROGRESS NOTES
[facility]  SUBJECTIVE:    Chief Complaint: Sebaceous cyst    Finesse Kirby   1975   48 y.o. male presents with a sebaceous cyst on his chest that has been growing for the last several years.  Nontender and not causing complaints other than its size and discomfort due to its location.  Interferes with exercises with his chiropractor.  No other significant medical conditions    Review of Systems   Constitutional:  Negative for activity change and appetite change.   HENT:  Negative for congestion and dental problem.    Eyes:  Negative for discharge and itching.   Respiratory:  Negative for apnea and chest tightness.    Cardiovascular:  Negative for chest pain and leg swelling.   Gastrointestinal:  Negative for abdominal distention and abdominal pain.   Endocrine: Negative for cold intolerance and heat intolerance.   Genitourinary:  Negative for difficulty urinating and dysuria.   Musculoskeletal:  Negative for arthralgias and back pain.   Skin:  Negative for color change and pallor.   Allergic/Immunologic: Negative for environmental allergies and food allergies.   Neurological:  Negative for dizziness and facial asymmetry.   Hematological:  Negative for adenopathy. Does not bruise/bleed easily.   Psychiatric/Behavioral:  Negative for agitation and behavioral problems.        No past medical history on file.  Past Surgical History:   Procedure Laterality Date    HERNIA REPAIR      KNEE SURGERY      KNEE SURGERY       Social History     Socioeconomic History    Marital status: Single     Spouse name: Not on file    Number of children: Not on file    Years of education: Not on file    Highest education level: Not on file   Occupational History    Not on file   Tobacco Use    Smoking status: Every Day     Current packs/day: 0.50     Types: Cigarettes    Smokeless tobacco: Never   Vaping Use    Vaping Use: Never used   Substance and Sexual Activity    Alcohol  [FreeTextEntry1] : surveillance follow up

## 2024-10-29 ENCOUNTER — OFFICE VISIT (OUTPATIENT)
Dept: INTERNAL MEDICINE CLINIC | Age: 49
End: 2024-10-29
Payer: COMMERCIAL

## 2024-10-29 ENCOUNTER — TELEPHONE (OUTPATIENT)
Dept: INTERNAL MEDICINE CLINIC | Age: 49
End: 2024-10-29

## 2024-10-29 VITALS
WEIGHT: 217 LBS | OXYGEN SATURATION: 97 % | BODY MASS INDEX: 30.38 KG/M2 | HEART RATE: 53 BPM | DIASTOLIC BLOOD PRESSURE: 80 MMHG | SYSTOLIC BLOOD PRESSURE: 110 MMHG | HEIGHT: 71 IN

## 2024-10-29 DIAGNOSIS — M79.602 LEFT ARM PAIN: ICD-10-CM

## 2024-10-29 DIAGNOSIS — M25.532 LEFT WRIST PAIN: Primary | ICD-10-CM

## 2024-10-29 PROCEDURE — G8484 FLU IMMUNIZE NO ADMIN: HCPCS | Performed by: NURSE PRACTITIONER

## 2024-10-29 PROCEDURE — G8427 DOCREV CUR MEDS BY ELIG CLIN: HCPCS | Performed by: NURSE PRACTITIONER

## 2024-10-29 PROCEDURE — G8417 CALC BMI ABV UP PARAM F/U: HCPCS | Performed by: NURSE PRACTITIONER

## 2024-10-29 PROCEDURE — 99213 OFFICE O/P EST LOW 20 MIN: CPT | Performed by: NURSE PRACTITIONER

## 2024-10-29 PROCEDURE — 4004F PT TOBACCO SCREEN RCVD TLK: CPT | Performed by: NURSE PRACTITIONER

## 2024-10-29 ASSESSMENT — ENCOUNTER SYMPTOMS
COLOR CHANGE: 0
SHORTNESS OF BREATH: 0

## 2024-10-29 NOTE — PROGRESS NOTES
SUBJECTIVE:    Patient ID: Finesse Kirby is a 49 y.o. male.    CC: Left wrist and arm pain    HPI: The patient presents to the office for an acute visit.    Patient reports he was recently doing cardiac rehab.  After finishing exercises, a blood pressure was taken and he developed left wrist pain.  This has persisted with radiating up his left arm.  He has not had any associated chest pain or shortness of breath.  He denies any discoloration of his arm.  He denies any redness or swelling.  He has full range of motion of his wrist.  Movement hurts at times.  It is not reproducible.  He feels the hand may be weak with lifting but denies any neck pain or arm weakness.      Current Outpatient Medications   Medication Sig Dispense Refill    ticagrelor (BRILINTA) 90 MG TABS tablet Take 1 tablet by mouth 2 times daily      aspirin 81 MG EC tablet Take 1 tablet by mouth daily      carvedilol (COREG) 3.125 MG tablet Take 1 tablet by mouth 2 times daily (with meals)      sacubitril-valsartan (ENTRESTO) 24-26 MG per tablet Take 1 tablet by mouth 2 times daily      atorvastatin (LIPITOR) 20 MG tablet Take 1 tablet by mouth every evening 90 tablet 3    miconazole (DESENEX) 2 % powder Apply topically 2 times daily. 45 g 5     No current facility-administered medications for this visit.          Review of Systems   Constitutional:  Negative for chills and fever.   Respiratory:  Negative for shortness of breath.    Cardiovascular:  Negative for chest pain.   Musculoskeletal:  Positive for arthralgias. Negative for joint swelling.   Skin:  Negative for color change and wound.   Neurological:  Positive for weakness.         OBJECTIVE:  Physical Exam  Constitutional:       Appearance: Normal appearance.   HENT:      Head: Normocephalic and atraumatic.   Cardiovascular:      Rate and Rhythm: Normal rate and regular rhythm.   Pulmonary:      Effort: Pulmonary effort is normal. No respiratory distress.      Breath sounds: No

## 2024-10-29 NOTE — TELEPHONE ENCOUNTER
Pt experiencing pain, numbness and tingling to left wrist. The pain started to the wrist on 10/9 when his BP was taken at cardio rehab. Pain has gotten worse and yesterday pain went up his arm. Today is the first day he has been experiencing the numbness and tingling. First opening with [facility] Thursday. Pt said he can come in today - anywhere he may be seen? There are openings tomorrow with covering providers.    Please advise, thanks.

## 2025-02-17 ENCOUNTER — OFFICE VISIT (OUTPATIENT)
Dept: INTERNAL MEDICINE CLINIC | Age: 50
End: 2025-02-17
Payer: COMMERCIAL

## 2025-02-17 VITALS
DIASTOLIC BLOOD PRESSURE: 80 MMHG | BODY MASS INDEX: 32.2 KG/M2 | SYSTOLIC BLOOD PRESSURE: 122 MMHG | HEART RATE: 63 BPM | HEIGHT: 71 IN | WEIGHT: 230 LBS | OXYGEN SATURATION: 98 %

## 2025-02-17 DIAGNOSIS — Z00.00 ANNUAL PHYSICAL EXAM: ICD-10-CM

## 2025-02-17 DIAGNOSIS — I25.5 ISCHEMIC CARDIOMYOPATHY: ICD-10-CM

## 2025-02-17 DIAGNOSIS — F10.21 ALCOHOLISM IN REMISSION (HCC): ICD-10-CM

## 2025-02-17 DIAGNOSIS — I25.10 CORONARY ARTERY DISEASE INVOLVING NATIVE CORONARY ARTERY OF NATIVE HEART WITHOUT ANGINA PECTORIS: ICD-10-CM

## 2025-02-17 DIAGNOSIS — Z00.00 ANNUAL PHYSICAL EXAM: Primary | ICD-10-CM

## 2025-02-17 DIAGNOSIS — E78.00 PURE HYPERCHOLESTEROLEMIA: ICD-10-CM

## 2025-02-17 DIAGNOSIS — F17.211 CIGARETTE NICOTINE DEPENDENCE IN REMISSION: ICD-10-CM

## 2025-02-17 LAB
ALBUMIN SERPL-MCNC: 4.6 G/DL (ref 3.4–5)
ALBUMIN/GLOB SERPL: 1.9 {RATIO} (ref 1.1–2.2)
ALP SERPL-CCNC: 79 U/L (ref 40–129)
ALT SERPL-CCNC: 125 U/L (ref 10–40)
ANION GAP SERPL CALCULATED.3IONS-SCNC: 11 MMOL/L (ref 3–16)
AST SERPL-CCNC: 45 U/L (ref 15–37)
BILIRUB SERPL-MCNC: 0.5 MG/DL (ref 0–1)
BUN SERPL-MCNC: 10 MG/DL (ref 7–20)
CALCIUM SERPL-MCNC: 9.9 MG/DL (ref 8.3–10.6)
CHLORIDE SERPL-SCNC: 104 MMOL/L (ref 99–110)
CHOLEST SERPL-MCNC: 132 MG/DL (ref 0–199)
CO2 SERPL-SCNC: 25 MMOL/L (ref 21–32)
CREAT SERPL-MCNC: 0.8 MG/DL (ref 0.9–1.3)
GFR SERPLBLD CREATININE-BSD FMLA CKD-EPI: >90 ML/MIN/{1.73_M2}
GLUCOSE SERPL-MCNC: 97 MG/DL (ref 70–99)
HDLC SERPL-MCNC: 35 MG/DL (ref 40–60)
LDLC SERPL CALC-MCNC: 63 MG/DL
POTASSIUM SERPL-SCNC: 4.4 MMOL/L (ref 3.5–5.1)
PROT SERPL-MCNC: 7 G/DL (ref 6.4–8.2)
SODIUM SERPL-SCNC: 140 MMOL/L (ref 136–145)
TRIGL SERPL-MCNC: 169 MG/DL (ref 0–150)
VLDLC SERPL CALC-MCNC: 34 MG/DL

## 2025-02-17 PROCEDURE — 99396 PREV VISIT EST AGE 40-64: CPT | Performed by: NURSE PRACTITIONER

## 2025-02-17 SDOH — ECONOMIC STABILITY: FOOD INSECURITY: WITHIN THE PAST 12 MONTHS, YOU WORRIED THAT YOUR FOOD WOULD RUN OUT BEFORE YOU GOT MONEY TO BUY MORE.: NEVER TRUE

## 2025-02-17 SDOH — ECONOMIC STABILITY: FOOD INSECURITY: WITHIN THE PAST 12 MONTHS, THE FOOD YOU BOUGHT JUST DIDN'T LAST AND YOU DIDN'T HAVE MONEY TO GET MORE.: NEVER TRUE

## 2025-02-17 ASSESSMENT — PATIENT HEALTH QUESTIONNAIRE - PHQ9
2. FEELING DOWN, DEPRESSED OR HOPELESS: NOT AT ALL
SUM OF ALL RESPONSES TO PHQ QUESTIONS 1-9: 0
1. LITTLE INTEREST OR PLEASURE IN DOING THINGS: NOT AT ALL
SUM OF ALL RESPONSES TO PHQ QUESTIONS 1-9: 0
SUM OF ALL RESPONSES TO PHQ9 QUESTIONS 1 & 2: 0

## 2025-02-18 LAB
EST. AVERAGE GLUCOSE BLD GHB EST-MCNC: 119.8 MG/DL
HBA1C MFR BLD: 5.8 %

## 2025-02-24 ASSESSMENT — ENCOUNTER SYMPTOMS
GASTROINTESTINAL NEGATIVE: 1
RESPIRATORY NEGATIVE: 1

## 2025-02-24 NOTE — PROGRESS NOTES
SUBJECTIVE:    Patient ID: Finesse Kirby is a 49 y.o. male.    CC: Annual exam, coronary artery disease, hypercholesterolemia, alcoholism, nicotine addiction    HPI: The patient presents to the office today for annual physical examination and follow-up of chronic medical conditions.    Patient has a history of coronary artery disease, ischemic cardiomyopathy, and hypercholesterolemia.  He denies any chest pain or shortness of breath.  He is compliant with his medication regimen.    Patient has a history of alcoholism.  He reports no alcohol intake.    Patient has a history of tobacco abuse.  He reports no tobacco use.      No past medical history on file.     Current Outpatient Medications   Medication Sig Dispense Refill    ticagrelor (BRILINTA) 90 MG TABS tablet Take 1 tablet by mouth 2 times daily      aspirin 81 MG EC tablet Take 1 tablet by mouth daily      carvedilol (COREG) 3.125 MG tablet Take 1 tablet by mouth 2 times daily (with meals)      sacubitril-valsartan (ENTRESTO) 24-26 MG per tablet Take 1 tablet by mouth 2 times daily      atorvastatin (LIPITOR) 20 MG tablet Take 1 tablet by mouth every evening 90 tablet 3    miconazole (DESENEX) 2 % powder Apply topically 2 times daily. 45 g 5     No current facility-administered medications for this visit.           Review of Systems   Constitutional: Negative.    Respiratory: Negative.     Cardiovascular: Negative.    Gastrointestinal: Negative.    Genitourinary: Negative.    Musculoskeletal: Negative.    Neurological: Negative.    Psychiatric/Behavioral: Negative.           OBJECTIVE:  Physical Exam  Vitals reviewed.   Constitutional:       General: He is not in acute distress.     Appearance: He is well-developed. He is not diaphoretic.   HENT:      Head: Normocephalic and atraumatic.   Eyes:      General: No scleral icterus.     Conjunctiva/sclera: Conjunctivae normal.   Neck:      Vascular: No JVD.   Cardiovascular:      Rate and Rhythm: Normal rate

## 2025-02-25 DIAGNOSIS — R74.01 TRANSAMINITIS: Primary | ICD-10-CM

## 2025-02-27 ENCOUNTER — HOSPITAL ENCOUNTER (OUTPATIENT)
Dept: ULTRASOUND IMAGING | Age: 50
Discharge: HOME | End: 2025-02-27
Payer: COMMERCIAL

## 2025-02-27 DIAGNOSIS — R74.01 TRANSAMINITIS: ICD-10-CM

## 2025-02-27 PROCEDURE — 76705 ECHO EXAM OF ABDOMEN: CPT

## 2025-05-08 DIAGNOSIS — E78.2 MIXED HYPERLIPIDEMIA: ICD-10-CM

## 2025-05-08 RX ORDER — ATORVASTATIN CALCIUM 20 MG/1
20 TABLET, FILM COATED ORAL DAILY
Qty: 90 TABLET | Refills: 1 | Status: SHIPPED | OUTPATIENT
Start: 2025-05-08

## 2025-05-08 NOTE — TELEPHONE ENCOUNTER
Okay to fill.     Medication:   Requested Prescriptions     Pending Prescriptions Disp Refills    atorvastatin (LIPITOR) 20 MG tablet [Pharmacy Med Name: ATORVASTATIN 20 MG TABLET] 30 tablet      Sig: TAKE 1 TABLET BY MOUTH DAILY        Last Filled:  1/22/24    Patient Phone Number: [phone] (home)     Last appt: 2/17/2025   Next appt: Visit date not found    Last OARRS:        No data to display

## 2025-05-12 DIAGNOSIS — R74.01 TRANSAMINITIS: ICD-10-CM

## 2025-05-12 DIAGNOSIS — E78.2 MIXED HYPERLIPIDEMIA: ICD-10-CM

## 2025-05-12 LAB
ALBUMIN SERPL-MCNC: 4.4 G/DL (ref 3.4–5)
ALP SERPL-CCNC: 77 U/L (ref 40–129)
ALT SERPL-CCNC: 66 U/L (ref 10–40)
AST SERPL-CCNC: 36 U/L (ref 15–37)
BILIRUB DIRECT SERPL-MCNC: 0.3 MG/DL (ref 0–0.3)
BILIRUB INDIRECT SERPL-MCNC: 0.5 MG/DL (ref 0–1)
BILIRUB SERPL-MCNC: 0.8 MG/DL (ref 0–1)
PROT SERPL-MCNC: 6.6 G/DL (ref 6.4–8.2)

## 2025-05-12 RX ORDER — ATORVASTATIN CALCIUM 80 MG/1
80 TABLET, FILM COATED ORAL NIGHTLY
Qty: 90 TABLET | Refills: 3 | Status: SHIPPED | OUTPATIENT
Start: 2025-05-12

## 2025-05-13 ENCOUNTER — RESULTS FOLLOW-UP (OUTPATIENT)
Dept: INTERNAL MEDICINE CLINIC | Age: 50
End: 2025-05-13